# Patient Record
Sex: FEMALE | Race: WHITE | URBAN - METROPOLITAN AREA
[De-identification: names, ages, dates, MRNs, and addresses within clinical notes are randomized per-mention and may not be internally consistent; named-entity substitution may affect disease eponyms.]

---

## 2017-06-28 ENCOUNTER — HOSPITAL ENCOUNTER (EMERGENCY)
Facility: CLINIC | Age: 77
Discharge: HOME | End: 2017-06-28
Attending: EMERGENCY MEDICINE | Admitting: EMERGENCY MEDICINE
Payer: MEDICARE

## 2017-06-28 VITALS
HEIGHT: 65 IN | TEMPERATURE: 98.3 F | DIASTOLIC BLOOD PRESSURE: 85 MMHG | OXYGEN SATURATION: 95 % | WEIGHT: 145 LBS | RESPIRATION RATE: 18 BRPM | HEART RATE: 75 BPM | SYSTOLIC BLOOD PRESSURE: 149 MMHG | BODY MASS INDEX: 24.16 KG/M2

## 2017-06-28 DIAGNOSIS — S81.812A LACERATION OF LEFT LOWER LEG, INITIAL ENCOUNTER: ICD-10-CM

## 2017-06-28 DIAGNOSIS — Z23 NEED FOR TDAP VACCINATION: ICD-10-CM

## 2017-06-28 PROCEDURE — 90715 TDAP VACCINE 7 YRS/> IM: CPT | Performed by: EMERGENCY MEDICINE

## 2017-06-28 PROCEDURE — 99283 EMERGENCY DEPT VISIT LOW MDM: CPT | Mod: 25

## 2017-06-28 PROCEDURE — 12004 RPR S/N/AX/GEN/TRK7.6-12.5CM: CPT

## 2017-06-28 PROCEDURE — 90471 IMMUNIZATION ADMIN: CPT

## 2017-06-28 PROCEDURE — 25000128 H RX IP 250 OP 636: Performed by: EMERGENCY MEDICINE

## 2017-06-28 RX ORDER — LIDOCAINE HYDROCHLORIDE AND EPINEPHRINE 10; 10 MG/ML; UG/ML
INJECTION, SOLUTION INFILTRATION; PERINEURAL
Status: DISCONTINUED
Start: 2017-06-28 | End: 2017-06-28 | Stop reason: HOSPADM

## 2017-06-28 RX ORDER — GINSENG 100 MG
CAPSULE ORAL
Status: DISCONTINUED
Start: 2017-06-28 | End: 2017-06-28 | Stop reason: HOSPADM

## 2017-06-28 RX ADMIN — CLOSTRIDIUM TETANI TOXOID ANTIGEN (FORMALDEHYDE INACTIVATED), CORYNEBACTERIUM DIPHTHERIAE TOXOID ANTIGEN (FORMALDEHYDE INACTIVATED), BORDETELLA PERTUSSIS TOXOID ANTIGEN (GLUTARALDEHYDE INACTIVATED), BORDETELLA PERTUSSIS FILAMENTOUS HEMAGGLUTININ ANTIGEN (FORMALDEHYDE INACTIVATED), BORDETELLA PERTUSSIS PERTACTIN ANTIGEN, AND BORDETELLA PERTUSSIS FIMBRIAE 2/3 ANTIGEN 0.5 ML: 5; 2; 2.5; 5; 3; 5 INJECTION, SUSPENSION INTRAMUSCULAR at 15:36

## 2017-06-28 ASSESSMENT — ENCOUNTER SYMPTOMS
NAUSEA: 0
FEVER: 0
COUGH: 0
CHILLS: 0
WOUND: 1
VOMITING: 0
HEADACHES: 0
SHORTNESS OF BREATH: 0
DIZZINESS: 0
ABDOMINAL PAIN: 0

## 2017-06-28 NOTE — ED NOTES
ABCs intact. Pt has laceration to L leg. Pt was trying to climb up on a chair and missed. Bleeding Controlled. Pt takes an aspirin 325mg daily.

## 2017-06-28 NOTE — ED AVS SNAPSHOT
[facility] Emergency Department    [address] [place] [address]    Phone:  [phone]    Fax:  [phone]                                       Ioana Martin   MRN: [id number]    Department:  [facility] Emergency Department   Date of Visit:  6/28/2017           Patient Information     Date Of Birth          1940        Your diagnoses for this visit were:     Laceration of left lower leg, initial encounter     Need for Tdap vaccination        You were seen by Barby Haines MD.      Follow-up Information     Follow up with ED, urgent care, or clinic.    Why:  10-14 days for suture removal or immediately for signs of infection        Discharge Instructions       Discharge Instructions  Laceration (Cut)    You were seen today for a laceration (cut).  Your doctor examined your laceration for any problems such a buried foreign body (like glass, a splinter, or gravel), or injury to blood vessels, tendons, and nerves.  Your doctor may have also rinsed and/or scrubbed your laceration to help prevent an infection.  Your laceration may have been closed with glue, staples or sutures (stitches).      It may not be possible to find all problems with your laceration on the first visit, and we can't always prevent infections.  Antibiotics are only given when the benefit is more than the risk, and don't prevent all infections. Some lacerations are too high risk to close, and are left open to heal.  All lacerations, no matter how expertly repaired, will cause scarring.    Return to the Emergency Department right away if:    You have more redness, swelling, pain, drainage (pus), a bad smell, or red streaking from your laceration.      You have a fever of 101oF or more.    You have bleeding that you can t stop at home. If your cut starts to bleed, hold pressure on the bleeding area with a clean cloth or put pressure over the bandage.  If the bleeding doesn t stop after  using constant pressure for 30 minutes, you should return to the Emergency Department for further treatment.    An area past the laceration is cool, pale, or blue compared with the other side, or has a slower return of color when squeezed.    Your dressing seems too tight or starts to get uncomfortable or painful.    You have loss of normal function or use of an area, such as being unable to straighten or bend a finger normally.    You have a numb area past the laceration.    Return to the Emergency Department or see your regular doctor if:    The laceration starts to come open.     You have something coming out of the cut or a feeling that there is something in the laceration.    Your wound will not heal, or keeps breaking open. There can always be glass, wood, dirt or other things in any wound.  They won t always show up, even on x-rays.  If a wound doesn t heal, this may be why, and it is important to follow-up with your regular doctor.    Home Care:    Take your dressing off in 12 hours, or as instructed by your doctor, to check your laceration. Remove the dressing sooner if it seems too tight or painful, or if it is getting numb, tingly, or pale past the dressing.    Gently wash your laceration 2 times a day with clean cloth and soap.     It is okay to shower, but do not let the laceration soak in water.      If your laceration was closed with wound adhesive or strips: pat it dry and leave it open to the air.     For all other repairs: after you wash your laceration, or at least 2 times a day, apply bacitracin or other antibiotic ointment to the laceration, then cover it with a Band-Aid  or gauze.    Keep the laceration clean. Wear gloves or other protective clothing if you are around dirt.    Follow-up:    Your sutures or staples need to be removed in 10-14 days. Schedule an appointment with your regular doctor to have this done.    Scars:  To help minimize scarring:    Wear sunscreen over the healed  "laceration when out in the sun.    Massage the area regularly.    You may use Vitamin E oil.    Wait a year.  Most scars will start to fade within a year.    Probiotics: If you have been given an antibiotic, you may want to also take a probiotic pill or eat yogurt with live cultures. Probiotics have \"good bacteria\" to help your intestines stay healthy. Studies have shown that probiotics help prevent diarrhea and other intestine problems (including C. diff infection) when you take antibiotics. You can buy these without a prescription in the pharmacy section of the store.     If you were given a prescription for medicine here today, be sure to read all of the information (including the package insert) that comes with your prescription.  This will include important information about the medicine, its side effects, and any warnings that you need to know about.  The pharmacist who fills the prescription can provide more information and answer questions you may have about the medicine.  If you have questions or concerns that the pharmacist cannot address, please call or return to the Emergency Department.           24 Hour Appointment Hotline       To make an appointment at any [facility], call [phone] ([phone]). If you don't have a family doctor or clinic, we will help you find one. [facility] clinics are conveniently located to serve the needs of you and your family.             Review of your medicines      Notice     You have not been prescribed any medications.            Orders Needing Specimen Collection     None      Pending Results     No orders found from 6/26/2017 to 6/29/2017.            Pending Culture Results     No orders found from 6/26/2017 to 6/29/2017.            Pending Results Instructions     If you had any lab results that were not finalized at the time of your Discharge, you can call the ED Lab Result RN at [phone]. You will be contacted by this team for any positive Lab " results or changes in treatment. The nurses are available 7 days a week from 10A to 6:30P.  You can leave a message 24 hours per day and they will return your call.        Test Results From Your Hospital Stay               Clinical Quality Measure: Blood Pressure Screening     Your blood pressure was checked while you were in the emergency department today. The last reading we obtained was  BP: 149/85 . Please read the guidelines below about what these numbers mean and what you should do about them.  If your systolic blood pressure (the top number) is less than 120 and your diastolic blood pressure (the bottom number) is less than 80, then your blood pressure is normal. There is nothing more that you need to do about it.  If your systolic blood pressure (the top number) is 120-139 or your diastolic blood pressure (the bottom number) is 80-89, your blood pressure may be higher than it should be. You should have your blood pressure rechecked within a year by a primary care provider.  If your systolic blood pressure (the top number) is 140 or greater or your diastolic blood pressure (the bottom number) is 90 or greater, you may have high blood pressure. High blood pressure is treatable, but if left untreated over time it can put you at risk for heart attack, stroke, or kidney failure. You should have your blood pressure rechecked by a primary care provider within the next 4 weeks.  If your provider in the emergency department today gave you specific instructions to follow-up with your doctor or provider even sooner than that, you should follow that instruction and not wait for up to 4 weeks for your follow-up visit.        Thank you for choosing [facility]       Thank you for choosing [facility] for your care. Our goal is always to provide you with excellent care. Hearing back from our patients is one way we can continue to improve our services. Please take a few minutes to complete the written survey that you may receive  "in the mail after you visit with us. Thank you!        [facility] Information     [employer] lets you send messages to your doctor, view your test results, renew your prescriptions, schedule appointments and more. To sign up, go to [email or web address] . Click on \"Log in\" on the left side of the screen, which will take you to the Welcome page. Then click on \"Sign up Now\" on the right side of the page.     You will be asked to enter the access code listed below, as well as some personal information. Please follow the directions to create your username and password.     Your access code is: [email or web address]  Expires: 2017  3:32 PM     Your access code will  in 90 days. If you need help or a new code, please call your [facility] clinic or [phone].        Care EveryWhere ID     This is your Care EveryWhere ID. This could be used by other organizations to access your [facility] medical records  [id number]        Equal Access to Services     LOLITA OROZCO : Hadii milton rodriguezo Sofarida, waaxda luqadaha, qaybta kaalmada adesalty, shayan garcia . So [facility] [phone].    ATENCIÓN: Si habla español, tiene a banda disposición servicios gratuitos de asistencia lingüística. Llame al [phone].    We comply with applicable federal civil rights laws and [place] laws. We do not discriminate on the basis of race, color, national origin, age, disability sex, sexual orientation or gender identity.            After Visit Summary       This is your record. Keep this with you and show to your community pharmacist(s) and doctor(s) at your next visit.                  "

## 2017-06-28 NOTE — ED PROVIDER NOTES
"  History   Chief Complaint:  Laceration     HPI   Ioana Martin is a 77 year old female on aspirin who presents with a laceration on her left lower leg. She reports she was climbing up on a chair to clean up high, missed the chair and hit her leg on the chair. She reports she does not know when her last tetanus was. She reports her PCP was at [facility] but rarely goes to doctors. She denies any head injury, nausea, vomiting or headache.  No other injuries.    Allergies:  No known drug allergies    Medications:    Aspirin 325 mg    Past Medical History:    The patient does not have any past pertinent medical history.    Past Surgical History:    History reviewed. No pertinent surgical history.    Family History:    History reviewed. No pertinent family history.     Social History:  The patient denies tobacco, alcohol, or drug use.     Review of Systems   Constitutional: Negative for chills and fever.   Respiratory: Negative for cough and shortness of breath.    Cardiovascular: Negative for chest pain.   Gastrointestinal: Negative for abdominal pain, nausea and vomiting.   Skin: Positive for wound (left leg).   Neurological: Negative for dizziness, syncope and headaches.   All other systems reviewed and are negative.    Physical Exam   Patient Vitals for the past 24 hrs:   BP Temp Temp src Pulse Resp SpO2 Height Weight   06/28/17 1327 149/85 98.3  F (36.8  C) Oral 75 18 95 % 1.651 m (5' 5\") 65.8 kg (145 lb)     Physical Exam  General: Alert and cooperative.  No apparent distress  Resp:  Non-labored  Neuro: Speech is normal and fluent. Moving all extremities  Skin:  Horizontal gaping laceration lower shin  MSkel: Left DF/PF strength intact, sensation intact; no bony tenderness or deformity    Emergency Department Course   Procedures:  PROCEDURE: Laceration Repair    LACERATION: A simple clean 9.0 cm laceration.    LOCATION: left lower leg    FUNCTION: Distally sensation/circulation/motor function/tendon function are " intact.    ANESTHESIA: Local using 1% Lidocaine w/ Epi,  4 cc's    PREPARATION: Soaking/Irrigation/Scrubbing with Normal Saline/Dione Delarosa    DEBRIDEMENT: no debridement/wound explored/no foreign body found    CLOSURE: Wound was closed in *1 layer(s) using 4.0 ethilon, 11 simple interrupted sutures.    Wound care instructions were given and the patient was informed to watch for signs of infection, including any redness swelling, warmth or drainage from the wound.    Interventions:   Tdap 0.56 mL intramuscular     Emergency Department Course:  Past medical records, nursing notes, and vitals reviewed.  1430: I performed an exam of the patient and obtained history, as documented above.  1511: The wound was irrigated and sutured closed.   Tdap was given.    I rechecked the patient.  Findings and plan explained to the Patient. Patient discharged home with instructions regarding supportive care, medications, and reasons to return. The importance of close follow-up was reviewed.     Impression & Plan    Medical Decision Making:  The patient presented with a laceration on her left shin. Fortunately no traumatic bony injuries from fall.  Tetanus is not up to date per [facility] or [facility]. The wound was carefully evaluated and explored.  The laceration was closed with as noted above.  There is no evidence of muscular, tendon, or bony damage with this laceration.  No signs of foreign body.  Possible complications (infection, scarring) and reasons for immediate re-evaluation were reviewed with the patient.  Suture removal 10-14 days.    Diagnosis:    ICD-10-CM   1. Laceration of left lower leg, initial encounter S81.812A   2. Need for Tdap vaccination Z23     Disposition:  Discharged to home    Sherine Crouhc  6/28/2017   [facility] EMERGENCY DEPARTMENT    ISherine am serving as a scribe at 2:31 PM on 6/28/2017 to document services personally performed by Barby Haines MD based on  my observations and the provider's statements to me.        Barby Haines MD  06/28/17 1822

## 2017-06-28 NOTE — ED AVS SNAPSHOT
[facility] Emergency Department    [address]    [facility] [address]    Phone:  [phone]    Fax:  [phone]                                       Ioana Martin   MRN: [id number]    Department:  [facility] Emergency Department   Date of Visit:  6/28/2017           After Visit Summary Signature Page     I have received my discharge instructions, and my questions have been answered. I have discussed any challenges I see with this plan with the nurse or doctor.    ..........................................................................................................................................  Patient/Patient Representative Signature      ..........................................................................................................................................  Patient Representative Print Name and Relationship to Patient    ..................................................               ................................................  Date                                            Time    ..........................................................................................................................................  Reviewed by Signature/Title    ...................................................              ..............................................  Date                                                            Time

## 2017-06-28 NOTE — DISCHARGE INSTRUCTIONS
Discharge Instructions  Laceration (Cut)    You were seen today for a laceration (cut).  Your doctor examined your laceration for any problems such a buried foreign body (like glass, a splinter, or gravel), or injury to blood vessels, tendons, and nerves.  Your doctor may have also rinsed and/or scrubbed your laceration to help prevent an infection.  Your laceration may have been closed with glue, staples or sutures (stitches).      It may not be possible to find all problems with your laceration on the first visit, and we can't always prevent infections.  Antibiotics are only given when the benefit is more than the risk, and don't prevent all infections. Some lacerations are too high risk to close, and are left open to heal.  All lacerations, no matter how expertly repaired, will cause scarring.    Return to the Emergency Department right away if:    You have more redness, swelling, pain, drainage (pus), a bad smell, or red streaking from your laceration.      You have a fever of 101oF or more.    You have bleeding that you can t stop at home. If your cut starts to bleed, hold pressure on the bleeding area with a clean cloth or put pressure over the bandage.  If the bleeding doesn t stop after using constant pressure for 30 minutes, you should return to the Emergency Department for further treatment.    An area past the laceration is cool, pale, or blue compared with the other side, or has a slower return of color when squeezed.    Your dressing seems too tight or starts to get uncomfortable or painful.    You have loss of normal function or use of an area, such as being unable to straighten or bend a finger normally.    You have a numb area past the laceration.    Return to the Emergency Department or see your regular doctor if:    The laceration starts to come open.     You have something coming out of the cut or a feeling that there is something in the laceration.    Your wound will not heal, or keeps breaking  "open. There can always be glass, wood, dirt or other things in any wound.  They won t always show up, even on x-rays.  If a wound doesn t heal, this may be why, and it is important to follow-up with your regular doctor.    Home Care:    Take your dressing off in 12 hours, or as instructed by your doctor, to check your laceration. Remove the dressing sooner if it seems too tight or painful, or if it is getting numb, tingly, or pale past the dressing.    Gently wash your laceration 2 times a day with clean cloth and soap.     It is okay to shower, but do not let the laceration soak in water.      If your laceration was closed with wound adhesive or strips: pat it dry and leave it open to the air.     For all other repairs: after you wash your laceration, or at least 2 times a day, apply bacitracin or other antibiotic ointment to the laceration, then cover it with a Band-Aid  or gauze.    Keep the laceration clean. Wear gloves or other protective clothing if you are around dirt.    Follow-up:    Your sutures or staples need to be removed in 10-14 days. Schedule an appointment with your regular doctor to have this done.    Scars:  To help minimize scarring:    Wear sunscreen over the healed laceration when out in the sun.    Massage the area regularly.    You may use Vitamin E oil.    Wait a year.  Most scars will start to fade within a year.    Probiotics: If you have been given an antibiotic, you may want to also take a probiotic pill or eat yogurt with live cultures. Probiotics have \"good bacteria\" to help your intestines stay healthy. Studies have shown that probiotics help prevent diarrhea and other intestine problems (including C. diff infection) when you take antibiotics. You can buy these without a prescription in the pharmacy section of the store.     If you were given a prescription for medicine here today, be sure to read all of the information (including the package insert) that comes with your prescription. "  This will include important information about the medicine, its side effects, and any warnings that you need to know about.  The pharmacist who fills the prescription can provide more information and answer questions you may have about the medicine.  If you have questions or concerns that the pharmacist cannot address, please call or return to the Emergency Department.

## 2017-07-13 ENCOUNTER — HOSPITAL ENCOUNTER (EMERGENCY)
Facility: CLINIC | Age: 77
Discharge: HOME | End: 2017-07-13
Attending: EMERGENCY MEDICINE | Admitting: EMERGENCY MEDICINE
Payer: MEDICARE

## 2017-07-13 VITALS
RESPIRATION RATE: 18 BRPM | DIASTOLIC BLOOD PRESSURE: 85 MMHG | TEMPERATURE: 97.9 F | OXYGEN SATURATION: 97 % | SYSTOLIC BLOOD PRESSURE: 136 MMHG

## 2017-07-13 DIAGNOSIS — Z51.89 VISIT FOR WOUND CHECK: ICD-10-CM

## 2017-07-13 DIAGNOSIS — T81.30XA WOUND DEHISCENCE: ICD-10-CM

## 2017-07-13 DIAGNOSIS — Z48.02 ENCOUNTER FOR REMOVAL OF SUTURES: ICD-10-CM

## 2017-07-13 PROCEDURE — 25000125 ZZHC RX 250

## 2017-07-13 PROCEDURE — 99283 EMERGENCY DEPT VISIT LOW MDM: CPT

## 2017-07-13 RX ORDER — CEPHALEXIN 500 MG/1
500 CAPSULE ORAL 3 TIMES DAILY
Qty: 21 CAPSULE | Refills: 0 | Status: SHIPPED | OUTPATIENT
Start: 2017-07-13 | End: 2017-07-20

## 2017-07-13 RX ORDER — GINSENG 100 MG
CAPSULE ORAL 2 TIMES DAILY
Qty: 28 G | Refills: 0 | Status: SHIPPED | OUTPATIENT
Start: 2017-07-13

## 2017-07-13 RX ORDER — GINSENG 100 MG
CAPSULE ORAL
Status: COMPLETED
Start: 2017-07-13 | End: 2017-07-13

## 2017-07-13 RX ADMIN — BACITRACIN: 500 OINTMENT TOPICAL at 11:04

## 2017-07-13 ASSESSMENT — ENCOUNTER SYMPTOMS: WOUND: 1

## 2017-07-13 NOTE — ED PROVIDER NOTES
History     Chief Complaint:  Suture Removal and Wound Check    HPI   Ioana Martin is a 77 year old female who presents to the emergency department today for evaluation of a wound and for suture removal. The patient reports that she was in the emergency department on 06/28/17 following an accident where she was climbing up on a chair to clean and missed the chair, wounding her left shin. She received 11 stitches for this. She presents here today to have these stitches removed. She notes a little swelling around the wound and some pain around the edges. She states that it has bled a little since the initial incident but that she has not noticed any pus.    Allergies:  No Known Drug Allergies     Medications:    Medications reviewed. No current medications.    Past Medical History:    History reviewed. No pertinent medical history.    Past Surgical History:    History reviewed. No pertinent surgical history.    Family History:    History reviewed. No pertinent family history.    Social History:  Marital Status:       Review of Systems   Skin: Positive for wound (left shin).   All other systems reviewed and are negative.    Physical Exam     Vitals:  No data found.    Physical Exam    HEENT:  mmm  Neck: supple  CV: ppi, regular   Resp: speaking in full sentences with any resp distress  Ext: LLE - anterior inferior tibia there is a horizontally oriented 6 cm wound one suture still in place on my evaluation which was removed there is some clotted blood mild surrounding erythema no purulent drainage the lateral portion of the wound there is approximately 8 mm of dehiscence this will heal by secondary intention distal perfusion and sensation motor function are all intact  Skin: warm dry well perfused  Neuro: Alert, no gross motor or sensory deficits,  gait stable          Emergency Department Course     Interventions:  1104 Bacitracin 500 unit/gm Topical    Emergency Department Course:  Nursing notes and vitals  reviewed.  I performed an exam of the patient as documented above.   I discussed the treatment plan with the patient. They expressed understanding of this plan and consented to discharge. They will be discharged home with instructions for care and follow up. In addition, the patient will return to the emergency department if their symptoms persist, worsen, if new symptoms arise or if there is any concern.  All questions were answered.  I personally reviewed the patient's history and answered all related questions prior to discharge.    Impression & Plan      Medical Decision Making:  Ioana Martin is a 77 year old female here for suture removal. Triage RN concerned about wound infection and unable to remove one of the sutures. We did remove this. There was some early signs of secondary wound infection and so I will start here on antibiotics for this and let the wound heal by secondary intention rather than repeat suture repair.    Diagnosis:    ICD-10-CM    1. Encounter for removal of sutures Z48.02    2. Visit for wound check Z51.89    3. Wound dehiscence T81.30XA      Disposition:   The patient is discharged to home.    Discharge Medications:  New Prescriptions    BACITRACIN 500 UNIT/GM OINT    Apply topically 2 times daily    CEPHALEXIN (KEFLEX) 500 MG CAPSULE    Take 1 capsule (500 mg) by mouth 3 times daily for 7 days       Scribe Disclosure:  I, Tejas Mcrae, am serving as a scribe at 11:05 AM on 7/13/2017 to document services personally performed by Cornell Veliz MD based on my observations and the provider's statements to me.    [facility] EMERGENCY DEPARTMENT       Cornell Veliz MD  07/13/17 5711

## 2017-07-13 NOTE — ED NOTES
Attempted to remove sutures and found wound to have a large amount of dried drainage, black in color, and multiple small open areas along the wound where the skin did not come together and heal. There is one suture that is under dried black drainage, and when attempted to remove the suture there was purulent drainage coming from the wound. There is also swelling and pain present around the wound. Roomed patient to have wound evaluated by physician.

## 2017-07-13 NOTE — ED NOTES
Patient presents to have sutures removed from her left lower leg. She denies other complaints, alert and oriented, ABCs intact.

## 2017-07-13 NOTE — ED AVS SNAPSHOT
[facility] Emergency Department    [address]    [facility] [address]    Phone:  [phone]    Fax:  [phone]                                       Ioana Martin   MRN: [id number]    Department:  [facility] Emergency Department   Date of Visit:  7/13/2017           After Visit Summary Signature Page     I have received my discharge instructions, and my questions have been answered. I have discussed any challenges I see with this plan with the nurse or doctor.    ..........................................................................................................................................  Patient/Patient Representative Signature      ..........................................................................................................................................  Patient Representative Print Name and Relationship to Patient    ..................................................               ................................................  Date                                            Time    ..........................................................................................................................................  Reviewed by Signature/Title    ...................................................              ..............................................  Date                                                            Time

## 2017-07-13 NOTE — ED AVS SNAPSHOT
[facility] Emergency Department    [address] [place] [address]    Phone:  [phone]    Fax:  [phone]                                       Ioana Martin   MRN: [id number]    Department:  [facility] Emergency Department   Date of Visit:  7/13/2017           Patient Information     Date Of Birth          1940        Your diagnoses for this visit were:     Encounter for removal of sutures     Visit for wound check     Wound dehiscence        You were seen by Cornell Veliz MD.        Discharge Instructions       Please make an appointment to follow up with your primary care provider in 5-7 days if not improving.    Change dressing once to 2 times daily, use antibiotic ointment until skin is fully healed    Return to the emergency room if you have worsening pain or redness around the wound or starts to drain pus like a pimple    To care for wound tomorrow:    1.  Remove dressing before taking a shower.    2.  Wash your body and hair. Then at the end of your shower spray the wound with Sea Spray Wound .  Make sure you spray the entire wound.      3.  Pat dry once you are out of the shower    4.  Apply antibiotic ointment (bacitracin)    5.  Apply Curity Non-Adherent Strips to cover the wound.    6.  Wrap with Curity Stretch Bandage and secure with paper tape.     Continue to use the spray in the shower and antibiotic ointment until skin is fully healed.  After tomorrow you will only need cover the wound if you are concerned about it getting dirty, such as gardening.           Suture Removal, Infected Wound  Your sutures are being removed. Your wound has become infected. The wound will not heal properly unless the infection is cleared. Infection in a wound may also spread if it is not treated. In most cases, antibiotic medicines are prescribed to treat a wound infection.  Symptoms of a wound infection include:    Redness or swelling around the  wound    Warmth coming from the wound    New or worsening pain    Red streaks around the wound    Draining pus    Fever  Home care  Medicines    If you were given antibiotics, take them until they are gone or your healthcare provider tells you to stop. It is vital to finish the antibiotics even if you feel better. If you do not finish them, the infection may come back and be harder to treat.    Take medicine for pain as directed by your healthcare provider.  Wound care  Care for your wound as directed by the healthcare provider. This may include the following:    Apply a warm compress (clean cloth soaked in hot water) to the infected area for about 5 to 10 minutes at a time. Be very careful not to burn yourself. Test the cloth on a non-infected area to make sure it is not too hot.    Change the dressing daily. If it becomes wet, stained with wound fluid, or dirty, change it sooner. To change it:    Wash your hands with soap and water before changing the dressing.    Carefully remove the dressing and tape. If it sticks to the wound, you may need to wet it a little to remove it. (Do not do this if your healthcare provider has told you not to.)    Gently clean the wound with clean water (or saline) using gauze, a clean washcloth, or cotton swab.    Do not use soap, alcohol, peroxide or other cleansers.    If you were told to dry the wound before putting on a new dressing, gently pat. Do not rub.    Throw out the old dressing.    Wash your hands again before opening the new, clean dressing.    Wash your hands again when you are done.  Follow-up care  Follow up with your healthcare provider as advised. It is important to keep your follow-up appointment to be certain that the infection is being treated. If a culture was done, you will be notified if the treatment needs to change. Call as directed for the results.  When to seek medical advice  Call your health care provider right away if any of these occur:    Symptoms of  infection don't start to improve within 2 days of starting antibiotics.    Symptoms of infection get worse.    New symptoms, such as red streaks around the wound.    Fever of 100.4 F (38.0 C) or higher for more than 2 days after starting the antibiotics  Date Last Reviewed: 8/10/2015    8689-0489 [employer]. [address] [place] [address]. All rights reserved. This information is not intended as a substitute for professional medical care. Always follow your healthcare professional's instructions.            24 Hour Appointment Hotline       To make an appointment at any [facility], call [phone] ([phone]). If you don't have a family doctor or clinic, we will help you find one. [facility] clinics are conveniently located to serve the needs of you and your family.             Review of your medicines      START taking        Dose / Directions Last dose taken    bacitracin 500 UNIT/GM Oint   Quantity:  28 g        Apply topically 2 times daily   Refills:  0        cephALEXin 500 MG capsule   Commonly known as:  KEFLEX   Dose:  500 mg   Quantity:  21 capsule        Take 1 capsule (500 mg) by mouth 3 times daily for 7 days   Refills:  0                Prescriptions were sent or printed at these locations (2 Prescriptions)                   Other Prescriptions                Printed at Department/Unit printer (2 of 2)         bacitracin 500 UNIT/GM OINT               cephALEXin (KEFLEX) 500 MG capsule                Orders Needing Specimen Collection     None      Pending Results     No orders found from 7/11/2017 to 7/14/2017.            Pending Culture Results     No orders found from 7/11/2017 to 7/14/2017.            Pending Results Instructions     If you had any lab results that were not finalized at the time of your Discharge, you can call the ED Lab Result RN at [phone]. You will be contacted by this team for any positive Lab results or changes in treatment. The  nurses are available 7 days a week from 10A to 6:30P.  You can leave a message 24 hours per day and they will return your call.        Test Results From Your Hospital Stay               Clinical Quality Measure: Blood Pressure Screening     Your blood pressure was checked while you were in the emergency department today. The last reading we obtained was    . Please read the guidelines below about what these numbers mean and what you should do about them.  If your systolic blood pressure (the top number) is less than 120 and your diastolic blood pressure (the bottom number) is less than 80, then your blood pressure is normal. There is nothing more that you need to do about it.  If your systolic blood pressure (the top number) is 120-139 or your diastolic blood pressure (the bottom number) is 80-89, your blood pressure may be higher than it should be. You should have your blood pressure rechecked within a year by a primary care provider.  If your systolic blood pressure (the top number) is 140 or greater or your diastolic blood pressure (the bottom number) is 90 or greater, you may have high blood pressure. High blood pressure is treatable, but if left untreated over time it can put you at risk for heart attack, stroke, or kidney failure. You should have your blood pressure rechecked by a primary care provider within the next 4 weeks.  If your provider in the emergency department today gave you specific instructions to follow-up with your doctor or provider even sooner than that, you should follow that instruction and not wait for up to 4 weeks for your follow-up visit.        Thank you for choosing [facility]       Thank you for choosing [facility] for your care. Our goal is always to provide you with excellent care. Hearing back from our patients is one way we can continue to improve our services. Please take a few minutes to complete the written survey that you may receive in the mail after you visit with us. Thank  "you!        [facility] [employer] Information     [employer] lets you send messages to your doctor, view your test results, renew your prescriptions, schedule appointments and more. To sign up, go to [email or web address] . Click on \"Log in\" on the left side of the screen, which will take you to the Welcome page. Then click on \"Sign up Now\" on the right side of the page.     You will be asked to enter the access code listed below, as well as some personal information. Please follow the directions to create your username and password.     Your access code is: [email or web address]  Expires: 2017  3:32 PM     Your access code will  in 90 days. If you need help or a new code, please call your [facility] clinic or [phone].        Care EveryWhere ID     This is your Care EveryWhere ID. This could be used by other organizations to access your [facility] medical records  [id number]        Equal Access to Services     SHAREE OROZCO : Hadii [address] Sofarida, waaxda lujonas, qaybta kaalmathomas dominique, shayan garcia . So [facility] [phone].    ATENCIÓN: Si habla español, tiene a banda disposición servicios gratuitos de asistencia lingüística. Llame al [phone].    We comply with applicable federal civil rights laws and [place] laws. We do not discriminate on the basis of race, color, national origin, age, disability sex, sexual orientation or gender identity.            After Visit Summary       This is your record. Keep this with you and show to your community pharmacist(s) and doctor(s) at your next visit.                  "

## 2017-07-13 NOTE — DISCHARGE INSTRUCTIONS
Please make an appointment to follow up with your primary care provider in 5-7 days if not improving.    Change dressing once to 2 times daily, use antibiotic ointment until skin is fully healed    Return to the emergency room if you have worsening pain or redness around the wound or starts to drain pus like a pimple    To care for wound tomorrow:    1.  Remove dressing before taking a shower.    2.  Wash your body and hair. Then at the end of your shower spray the wound with Sea Spray Wound .  Make sure you spray the entire wound.      3.  Pat dry once you are out of the shower    4.  Apply antibiotic ointment (bacitracin)    5.  Apply Curity Non-Adherent Strips to cover the wound.    6.  Wrap with Curity Stretch Bandage and secure with paper tape.     Continue to use the spray in the shower and antibiotic ointment until skin is fully healed.  After tomorrow you will only need cover the wound if you are concerned about it getting dirty, such as gardening.           Suture Removal, Infected Wound  Your sutures are being removed. Your wound has become infected. The wound will not heal properly unless the infection is cleared. Infection in a wound may also spread if it is not treated. In most cases, antibiotic medicines are prescribed to treat a wound infection.  Symptoms of a wound infection include:    Redness or swelling around the wound    Warmth coming from the wound    New or worsening pain    Red streaks around the wound    Draining pus    Fever  Home care  Medicines    If you were given antibiotics, take them until they are gone or your healthcare provider tells you to stop. It is vital to finish the antibiotics even if you feel better. If you do not finish them, the infection may come back and be harder to treat.    Take medicine for pain as directed by your healthcare provider.  Wound care  Care for your wound as directed by the healthcare provider. This may include the following:    Apply a warm  compress (clean cloth soaked in hot water) to the infected area for about 5 to 10 minutes at a time. Be very careful not to burn yourself. Test the cloth on a non-infected area to make sure it is not too hot.    Change the dressing daily. If it becomes wet, stained with wound fluid, or dirty, change it sooner. To change it:    Wash your hands with soap and water before changing the dressing.    Carefully remove the dressing and tape. If it sticks to the wound, you may need to wet it a little to remove it. (Do not do this if your healthcare provider has told you not to.)    Gently clean the wound with clean water (or saline) using gauze, a clean washcloth, or cotton swab.    Do not use soap, alcohol, peroxide or other cleansers.    If you were told to dry the wound before putting on a new dressing, gently pat. Do not rub.    Throw out the old dressing.    Wash your hands again before opening the new, clean dressing.    Wash your hands again when you are done.  Follow-up care  Follow up with your healthcare provider as advised. It is important to keep your follow-up appointment to be certain that the infection is being treated. If a culture was done, you will be notified if the treatment needs to change. Call as directed for the results.  When to seek medical advice  Call your health care provider right away if any of these occur:    Symptoms of infection don't start to improve within 2 days of starting antibiotics.    Symptoms of infection get worse.    New symptoms, such as red streaks around the wound.    Fever of 100.4 F (38.0 C) or higher for more than 2 days after starting the antibiotics  Date Last Reviewed: 8/10/2015    0322-4862 [employer]. [address] [place] [address], [place] [address]. All rights reserved. This information is not intended as a substitute for professional medical care. Always follow your healthcare professional's instructions.